# Patient Record
Sex: MALE | Race: WHITE | Employment: OTHER | URBAN - METROPOLITAN AREA
[De-identification: names, ages, dates, MRNs, and addresses within clinical notes are randomized per-mention and may not be internally consistent; named-entity substitution may affect disease eponyms.]

---

## 2017-10-09 ENCOUNTER — APPOINTMENT (OUTPATIENT)
Dept: CT IMAGING | Age: 62
End: 2017-10-09
Attending: EMERGENCY MEDICINE
Payer: COMMERCIAL

## 2017-10-09 ENCOUNTER — HOSPITAL ENCOUNTER (OUTPATIENT)
Age: 62
Setting detail: OBSERVATION
Discharge: HOME | End: 2017-10-10
Attending: STUDENT IN AN ORGANIZED HEALTH CARE EDUCATION/TRAINING PROGRAM | Admitting: FAMILY MEDICINE
Payer: COMMERCIAL

## 2017-10-09 DIAGNOSIS — G45.9 TRANSIENT CEREBRAL ISCHEMIA, UNSPECIFIED TYPE: Primary | ICD-10-CM

## 2017-10-09 DIAGNOSIS — G47.33 OSA (OBSTRUCTIVE SLEEP APNEA): ICD-10-CM

## 2017-10-09 DIAGNOSIS — I16.0 HYPERTENSIVE URGENCY: ICD-10-CM

## 2017-10-09 PROBLEM — R20.0 NUMBNESS ON RIGHT SIDE: Status: ACTIVE | Noted: 2017-10-09

## 2017-10-09 LAB
ALBUMIN SERPL-MCNC: 4 G/DL (ref 3.5–5)
ALBUMIN/GLOB SERPL: 1 {RATIO} (ref 1.1–2.2)
ALP SERPL-CCNC: 83 U/L (ref 45–117)
ALT SERPL-CCNC: 29 U/L (ref 12–78)
ANION GAP SERPL CALC-SCNC: 11 MMOL/L (ref 5–15)
AST SERPL-CCNC: 29 U/L (ref 15–37)
BASOPHILS # BLD: 0 K/UL (ref 0–0.1)
BASOPHILS NFR BLD: 0 % (ref 0–1)
BILIRUB SERPL-MCNC: 0.7 MG/DL (ref 0.2–1)
BUN SERPL-MCNC: 18 MG/DL (ref 6–20)
BUN/CREAT SERPL: 15 (ref 12–20)
CALCIUM SERPL-MCNC: 8.1 MG/DL (ref 8.5–10.1)
CHLORIDE SERPL-SCNC: 104 MMOL/L (ref 97–108)
CK SERPL-CCNC: 143 U/L (ref 39–308)
CO2 SERPL-SCNC: 25 MMOL/L (ref 21–32)
CREAT SERPL-MCNC: 1.21 MG/DL (ref 0.7–1.3)
EOSINOPHIL # BLD: 0.2 K/UL (ref 0–0.4)
EOSINOPHIL NFR BLD: 3 % (ref 0–7)
ERYTHROCYTE [DISTWIDTH] IN BLOOD BY AUTOMATED COUNT: 12.5 % (ref 11.5–14.5)
GLOBULIN SER CALC-MCNC: 3.9 G/DL (ref 2–4)
GLUCOSE SERPL-MCNC: 92 MG/DL (ref 65–100)
HCT VFR BLD AUTO: 42.6 % (ref 36.6–50.3)
HGB BLD-MCNC: 15 G/DL (ref 12.1–17)
INR PPP: 1 (ref 0.9–1.1)
LYMPHOCYTES # BLD: 1.9 K/UL (ref 0.8–3.5)
LYMPHOCYTES NFR BLD: 38 % (ref 12–49)
MCH RBC QN AUTO: 33.4 PG (ref 26–34)
MCHC RBC AUTO-ENTMCNC: 35.2 G/DL (ref 30–36.5)
MCV RBC AUTO: 94.9 FL (ref 80–99)
MONOCYTES # BLD: 0.5 K/UL (ref 0–1)
MONOCYTES NFR BLD: 10 % (ref 5–13)
NEUTS SEG # BLD: 2.5 K/UL (ref 1.8–8)
NEUTS SEG NFR BLD: 49 % (ref 32–75)
PLATELET # BLD AUTO: 189 K/UL (ref 150–400)
POTASSIUM SERPL-SCNC: 3.9 MMOL/L (ref 3.5–5.1)
PROT SERPL-MCNC: 7.9 G/DL (ref 6.4–8.2)
PROTHROMBIN TIME: 10.6 SEC (ref 9–11.1)
RBC # BLD AUTO: 4.49 M/UL (ref 4.1–5.7)
SODIUM SERPL-SCNC: 140 MMOL/L (ref 136–145)
TROPONIN I SERPL-MCNC: <0.04 NG/ML
WBC # BLD AUTO: 5 K/UL (ref 4.1–11.1)

## 2017-10-09 PROCEDURE — 99218 HC RM OBSERVATION: CPT

## 2017-10-09 PROCEDURE — 80053 COMPREHEN METABOLIC PANEL: CPT | Performed by: EMERGENCY MEDICINE

## 2017-10-09 PROCEDURE — 74011250637 HC RX REV CODE- 250/637: Performed by: STUDENT IN AN ORGANIZED HEALTH CARE EDUCATION/TRAINING PROGRAM

## 2017-10-09 PROCEDURE — 82550 ASSAY OF CK (CPK): CPT | Performed by: EMERGENCY MEDICINE

## 2017-10-09 PROCEDURE — 85025 COMPLETE CBC W/AUTO DIFF WBC: CPT | Performed by: EMERGENCY MEDICINE

## 2017-10-09 PROCEDURE — 96375 TX/PRO/DX INJ NEW DRUG ADDON: CPT

## 2017-10-09 PROCEDURE — 84484 ASSAY OF TROPONIN QUANT: CPT | Performed by: EMERGENCY MEDICINE

## 2017-10-09 PROCEDURE — 36415 COLL VENOUS BLD VENIPUNCTURE: CPT | Performed by: EMERGENCY MEDICINE

## 2017-10-09 PROCEDURE — 74011250636 HC RX REV CODE- 250/636: Performed by: FAMILY MEDICINE

## 2017-10-09 PROCEDURE — 99284 EMERGENCY DEPT VISIT MOD MDM: CPT

## 2017-10-09 PROCEDURE — 93005 ELECTROCARDIOGRAM TRACING: CPT

## 2017-10-09 PROCEDURE — 93880 EXTRACRANIAL BILAT STUDY: CPT

## 2017-10-09 PROCEDURE — 85610 PROTHROMBIN TIME: CPT | Performed by: STUDENT IN AN ORGANIZED HEALTH CARE EDUCATION/TRAINING PROGRAM

## 2017-10-09 PROCEDURE — 70450 CT HEAD/BRAIN W/O DYE: CPT

## 2017-10-09 RX ORDER — ATORVASTATIN CALCIUM 40 MG/1
40 TABLET, FILM COATED ORAL DAILY
Status: DISCONTINUED | OUTPATIENT
Start: 2017-10-10 | End: 2017-10-10 | Stop reason: HOSPADM

## 2017-10-09 RX ORDER — METOPROLOL SUCCINATE 100 MG/1
100 TABLET, EXTENDED RELEASE ORAL EVERY EVENING
Status: ON HOLD | COMMUNITY
End: 2017-10-10

## 2017-10-09 RX ORDER — SODIUM CHLORIDE 0.9 % (FLUSH) 0.9 %
5-10 SYRINGE (ML) INJECTION AS NEEDED
Status: DISCONTINUED | OUTPATIENT
Start: 2017-10-09 | End: 2017-10-10 | Stop reason: HOSPADM

## 2017-10-09 RX ORDER — SODIUM CHLORIDE 9 MG/ML
125 INJECTION, SOLUTION INTRAVENOUS CONTINUOUS
Status: DISCONTINUED | OUTPATIENT
Start: 2017-10-09 | End: 2017-10-10

## 2017-10-09 RX ORDER — HYDRALAZINE HYDROCHLORIDE 20 MG/ML
15 INJECTION INTRAMUSCULAR; INTRAVENOUS ONCE
Status: COMPLETED | OUTPATIENT
Start: 2017-10-09 | End: 2017-10-09

## 2017-10-09 RX ORDER — SODIUM CHLORIDE 9 MG/ML
125 INJECTION, SOLUTION INTRAVENOUS CONTINUOUS
Status: DISCONTINUED | OUTPATIENT
Start: 2017-10-10 | End: 2017-10-10 | Stop reason: HOSPADM

## 2017-10-09 RX ORDER — SODIUM CHLORIDE 0.9 % (FLUSH) 0.9 %
5-10 SYRINGE (ML) INJECTION EVERY 8 HOURS
Status: DISCONTINUED | OUTPATIENT
Start: 2017-10-10 | End: 2017-10-10 | Stop reason: HOSPADM

## 2017-10-09 RX ORDER — ASPIRIN 81 MG/1
81 TABLET ORAL DAILY
Status: DISCONTINUED | OUTPATIENT
Start: 2017-10-10 | End: 2017-10-10 | Stop reason: HOSPADM

## 2017-10-09 RX ORDER — ASPIRIN 325 MG
325 TABLET ORAL ONCE
Status: COMPLETED | OUTPATIENT
Start: 2017-10-09 | End: 2017-10-09

## 2017-10-09 RX ORDER — OMEPRAZOLE 20 MG/1
20 CAPSULE, DELAYED RELEASE ORAL DAILY
COMMUNITY

## 2017-10-09 RX ADMIN — HYDRALAZINE HYDROCHLORIDE 15 MG: 20 INJECTION INTRAMUSCULAR; INTRAVENOUS at 21:54

## 2017-10-09 RX ADMIN — ASPIRIN 325 MG: 325 TABLET ORAL at 21:54

## 2017-10-09 NOTE — ED TRIAGE NOTES
Pt states that on Friday- Sunday pt had intermittent tingling in his right hand and arm.  Today at noon he states the tingling to his right arm, right side, right chest and face, and in the last hour in his right leg and foot

## 2017-10-09 NOTE — IP AVS SNAPSHOT
[address] 
[phone] Patient: Merle France MRN: [id number] GMQ:3/48/8542 You are allergic to the following No active allergies Recent Documentation Height Weight BMI Smoking Status 1.803 m 96.3 kg 29.62 kg/m2 Never Smoker Emergency Contacts Name Discharge Info Relation Home Work Mobile [address] [facility] CAREGIVER [3] Spouse [3] [phone] About your hospitalization You were admitted on:  October 9, 2017 You last received care in the:  [facility] 5 OBSERVATION You were discharged on:  October 10, 2017 Unit phone number:  [phone] Why you were hospitalized Your primary diagnosis was:  Numbness On Right Side Your diagnoses also included:  Tia (Transient Ischemic Attack), Hypertensive Urgency Providers Seen During Your Hospitalizations Provider Role Specialty Primary office phone Diana Bridges MD Attending Provider Emergency Medicine [phone] Tenzin Irving MD Attending Provider [facility] [phone] Murtaza Gutierrez MD Attending Provider Internal Medicine [phone] Your Primary Care Physician (PCP) Primary Care Physician Office Phone Office Fax Laura Osman [phone] Follow-up Information Follow up With Details Comments Contact Info Praneeth Canchola MD In 1 week  163 [facility] [address] [place] [address] 
[phone] Rakesh Christensen MD In 4 weeks need cpap [address] [address] 
[phone] Current Discharge Medication List  
  
START taking these medications Dose & Instructions Dispensing Information Comments Morning Noon Evening Bedtime  
 aspirin delayed-release 81 mg tablet Your last dose was:     
   
Your next dose is:    
   
   
 Dose:  81 mg  
 Take 1 Tab by mouth daily for 30 days. Quantity:  30 Tab Refills:  0  
     
   
   
   
  
 lisinopril-hydroCHLOROthiazide 20-25 mg per tablet Commonly known as:  Floyce Plenty Your last dose was: Your next dose is:    
   
   
 Dose:  1 Tab Take 1 Tab by mouth daily for 30 days. Quantity:  30 Tab Refills:  0 CONTINUE these medications which have CHANGED Dose & Instructions Dispensing Information Comments Morning Noon Evening Bedtime  
 metoprolol succinate 50 mg XL tablet Commonly known as:  TOPROL-XL What changed:   
- medication strength 
- how much to take Your last dose was: Your next dose is:    
   
   
 Dose:  50 mg Take 1 Tab by mouth every evening for 30 days. Dose confirmed with Rx Query. Quantity:  30 Tab Refills:  0 CONTINUE these medications which have NOT CHANGED Dose & Instructions Dispensing Information Comments Morning Noon Evening Bedtime PriLOSEC 20 mg capsule Generic drug:  omeprazole Your last dose was: Your next dose is:    
   
   
 Dose:  20 mg Take 20 mg by mouth daily. Refills:  0 Where to Get Your Medications Information on where to get these meds will be given to you by the nurse or doctor. ! Ask your nurse or doctor about these medications  
  aspirin delayed-release 81 mg tablet  
 lisinopril-hydroCHLOROthiazide 20-25 mg per tablet  
 metoprolol succinate 50 mg XL tablet Discharge Instructions Discharge Instructions PATIENT ID: Jevon Hackett MRN: [id number] YOB: 1955 DATE OF ADMISSION: 10/9/2017  8:15 PM   
DATE OF DISCHARGE: 10/10/2017 PRIMARY CARE PROVIDER: Liam Nicole MD  
 
ATTENDING PHYSICIAN: Danette Cox MD 
DISCHARGING PROVIDER: Danette Cox MD   
To contact this individual call [phone] and ask the  to page. If unavailable ask to be transferred the Adult Hospitalist Department. DISCHARGE DIAGNOSES TIA/ Uncontrolled HTN 
 
CONSULTATIONS: IP CONSULT TO NEUROLOGY 
IP CONSULT TO NEUROLOGY 
IP CONSULT TO HOSPITALIST 
 
PROCEDURES/SURGERIES: * No surgery found * PENDING TEST RESULTS:  
At the time of discharge the following test results are still pending: FOLLOW UP APPOINTMENTS:  
Follow-up Information Follow up With Details Comments Contact Info Alex Dale MD In 1 week  163 [facility] [address] [place] [address] 
[phone] Noe Bonilla MD In 4 weeks need cpap 1808 [facility] 101 [facility] [address] 
[phone] ADDITIONAL CARE RECOMMENDATIONS:  
 
DIET: Cardiac Diet ACTIVITY: Activity as tolerated WOUND CARE:  
 
EQUIPMENT needed:  
 
 
  
 SNF/Inpatient Rehab/LTAC Independent/assisted living Hospice Other: CDMP Checked:  
Yes x PROBLEM LIST Updated: 
Yes x Signed:  
Miracle Scott MD 
10/10/2017 
1:57 PM 
 
Discharge Orders None MyChart Announcement  We are excited to announce that we are making your provider's discharge notes available to you in [employer] ( [employer] ). You will see these notes when they are completed and signed by the physician that discharged you from your recent hospital stay. If you have any questions or concerns about any information you see in [employer] ( [employer] ), please call the Health Information Department where you were seen or reach out to your Primary Care Provider for more information about your plan of care. Introducing \[facility] [place] [facility]\"" & HEALTH SERVICES! [facility] introduces [employer] ( [employer] ) patient portal. Now you can access parts of your medical record, email your doctor's office, and request medication refills online. 1. In your internet browser, go to [email or web address] 2. Click on the First Time User? Click Here link in the Sign In box. You will see the New Member Sign Up page. 3. Enter your [employer] ( [employer] ) Access Code exactly as it appears below. You will not need to use this code after youve completed the sign-up process. If you do not sign up before the expiration date, you must request a new code. · [employer] ( [employer] ) Access Code: [email or web address] Expires: 1/8/2018  2:18 PM 
 
4. Enter the last four digits of your Social Security Number (xxxx) and Date of Birth (mm/dd/yyyy) as indicated and click Submit. You will be taken to the next sign-up page. 5. Create a SmartStartt ID. This will be your [employer] ( [employer] ) login ID and cannot be changed, so think of one that is secure and easy to remember. 6. Create a [employer] ( [employer] ) password. You can change your password at any time. 7. Enter your Password Reset Question and Answer. This can be used at a later time if you forget your password. 8. Enter your e-mail address. You will receive e-mail notification when new information is available in [address] 9. Click Sign Up. You can now view and download portions of your medical record. 10. Click the Download Summary menu link to download a portable copy of your medical information. If you have questions, please visit the Frequently Asked Questions section of the MyChart website. Remember, MyChart is NOT to be used for urgent needs. For medical emergencies, dial 911. Now available from your iPhone and Android! General Information Please provide this summary of care documentation to your next provider. Patient Signature:  ____________________________________________________________ Date:  ____________________________________________________________  
  
Althia Kras Provider Signature:  ____________________________________________________________ Date:  ____________________________________________________________

## 2017-10-09 NOTE — IP AVS SNAPSHOT
[address] [address] 
[phone] Patient: Richard Benito MRN: [id number] TTA:4/29/3169 Current Discharge Medication List  
  
START taking these medications Dose & Instructions Dispensing Information Comments Morning Noon Evening Bedtime  
 aspirin delayed-release 81 mg tablet Your last dose was: Your next dose is:    
   
   
 Dose:  81 mg Take 1 Tab by mouth daily for 30 days. Quantity:  30 Tab Refills:  0  
     
   
   
   
  
 lisinopril-hydroCHLOROthiazide 20-25 mg per tablet Commonly known as:  Denisse Jovi Your last dose was: Your next dose is:    
   
   
 Dose:  1 Tab Take 1 Tab by mouth daily for 30 days. Quantity:  30 Tab Refills:  0 CONTINUE these medications which have CHANGED Dose & Instructions Dispensing Information Comments Morning Noon Evening Bedtime  
 metoprolol succinate 50 mg XL tablet Commonly known as:  TOPROL-XL What changed:   
- medication strength 
- how much to take Your last dose was: Your next dose is:    
   
   
 Dose:  50 mg Take 1 Tab by mouth every evening for 30 days. Dose confirmed with Rx Query. Quantity:  30 Tab Refills:  0 CONTINUE these medications which have NOT CHANGED Dose & Instructions Dispensing Information Comments Morning Noon Evening Bedtime PriLOSEC 20 mg capsule Generic drug:  omeprazole Your last dose was: Your next dose is:    
   
   
 Dose:  20 mg Take 20 mg by mouth daily. Refills:  0 Where to Get Your Medications Information on where to get these meds will be given to you by the nurse or doctor. ! Ask your nurse or doctor about these medications  
  aspirin delayed-release 81 mg tablet  
 lisinopril-hydroCHLOROthiazide 20-25 mg per tablet metoprolol succinate 50 mg XL tablet

## 2017-10-10 ENCOUNTER — APPOINTMENT (OUTPATIENT)
Dept: MRI IMAGING | Age: 62
End: 2017-10-10
Attending: FAMILY MEDICINE
Payer: COMMERCIAL

## 2017-10-10 VITALS
WEIGHT: 212.38 LBS | HEIGHT: 71 IN | SYSTOLIC BLOOD PRESSURE: 161 MMHG | RESPIRATION RATE: 16 BRPM | BODY MASS INDEX: 29.73 KG/M2 | HEART RATE: 64 BPM | TEMPERATURE: 98 F | OXYGEN SATURATION: 96 % | DIASTOLIC BLOOD PRESSURE: 87 MMHG

## 2017-10-10 PROBLEM — R20.0 NUMBNESS ON RIGHT SIDE: Status: RESOLVED | Noted: 2017-10-09 | Resolved: 2017-10-10

## 2017-10-10 PROBLEM — I16.0 HYPERTENSIVE URGENCY: Status: RESOLVED | Noted: 2017-10-09 | Resolved: 2017-10-10

## 2017-10-10 PROBLEM — G45.9 TIA (TRANSIENT ISCHEMIC ATTACK): Status: RESOLVED | Noted: 2017-10-09 | Resolved: 2017-10-10

## 2017-10-10 LAB
ANION GAP SERPL CALC-SCNC: 9 MMOL/L (ref 5–15)
ATRIAL RATE: 54 BPM
BUN SERPL-MCNC: 17 MG/DL (ref 6–20)
BUN/CREAT SERPL: 15 (ref 12–20)
CALCIUM SERPL-MCNC: 8.3 MG/DL (ref 8.5–10.1)
CALCULATED P AXIS, ECG09: 51 DEGREES
CALCULATED R AXIS, ECG10: 40 DEGREES
CALCULATED T AXIS, ECG11: 38 DEGREES
CHLORIDE SERPL-SCNC: 105 MMOL/L (ref 97–108)
CHOLEST SERPL-MCNC: 174 MG/DL
CO2 SERPL-SCNC: 24 MMOL/L (ref 21–32)
CREAT SERPL-MCNC: 1.15 MG/DL (ref 0.7–1.3)
DIAGNOSIS, 93000: NORMAL
EST. AVERAGE GLUCOSE BLD GHB EST-MCNC: 105 MG/DL
GLUCOSE SERPL-MCNC: 106 MG/DL (ref 65–100)
HBA1C MFR BLD: 5.3 % (ref 4.2–6.3)
HDLC SERPL-MCNC: 33 MG/DL
HDLC SERPL: 5.3 {RATIO} (ref 0–5)
LDLC SERPL CALC-MCNC: 76.6 MG/DL (ref 0–100)
LIPID PROFILE,FLP: ABNORMAL
P-R INTERVAL, ECG05: 158 MS
POTASSIUM SERPL-SCNC: 3.3 MMOL/L (ref 3.5–5.1)
Q-T INTERVAL, ECG07: 442 MS
QRS DURATION, ECG06: 96 MS
QTC CALCULATION (BEZET), ECG08: 419 MS
SODIUM SERPL-SCNC: 138 MMOL/L (ref 136–145)
TRIGL SERPL-MCNC: 322 MG/DL (ref ?–150)
TSH SERPL DL<=0.05 MIU/L-ACNC: 2.63 UIU/ML (ref 0.36–3.74)
VENTRICULAR RATE, ECG03: 54 BPM
VLDLC SERPL CALC-MCNC: 64.4 MG/DL

## 2017-10-10 PROCEDURE — 74011250637 HC RX REV CODE- 250/637: Performed by: HOSPITALIST

## 2017-10-10 PROCEDURE — 96365 THER/PROPH/DIAG IV INF INIT: CPT

## 2017-10-10 PROCEDURE — 74011250637 HC RX REV CODE- 250/637: Performed by: INTERNAL MEDICINE

## 2017-10-10 PROCEDURE — 80048 BASIC METABOLIC PNL TOTAL CA: CPT | Performed by: FAMILY MEDICINE

## 2017-10-10 PROCEDURE — 74011000258 HC RX REV CODE- 258: Performed by: FAMILY MEDICINE

## 2017-10-10 PROCEDURE — A9576 INJ PROHANCE MULTIPACK: HCPCS | Performed by: FAMILY MEDICINE

## 2017-10-10 PROCEDURE — 99218 HC RM OBSERVATION: CPT

## 2017-10-10 PROCEDURE — 93306 TTE W/DOPPLER COMPLETE: CPT

## 2017-10-10 PROCEDURE — 74011250636 HC RX REV CODE- 250/636: Performed by: FAMILY MEDICINE

## 2017-10-10 PROCEDURE — 74011250637 HC RX REV CODE- 250/637: Performed by: FAMILY MEDICINE

## 2017-10-10 PROCEDURE — 36415 COLL VENOUS BLD VENIPUNCTURE: CPT | Performed by: FAMILY MEDICINE

## 2017-10-10 PROCEDURE — 70553 MRI BRAIN STEM W/O & W/DYE: CPT

## 2017-10-10 PROCEDURE — 84443 ASSAY THYROID STIM HORMONE: CPT | Performed by: FAMILY MEDICINE

## 2017-10-10 PROCEDURE — 96366 THER/PROPH/DIAG IV INF ADDON: CPT

## 2017-10-10 PROCEDURE — 80061 LIPID PANEL: CPT | Performed by: FAMILY MEDICINE

## 2017-10-10 PROCEDURE — 83036 HEMOGLOBIN GLYCOSYLATED A1C: CPT | Performed by: FAMILY MEDICINE

## 2017-10-10 RX ORDER — LISINOPRIL AND HYDROCHLOROTHIAZIDE 20; 25 MG/1; MG/1
1 TABLET ORAL DAILY
Qty: 30 TAB | Refills: 0 | Status: SHIPPED | OUTPATIENT
Start: 2017-10-10 | End: 2017-11-09

## 2017-10-10 RX ORDER — ASPIRIN 81 MG/1
81 TABLET ORAL DAILY
Qty: 30 TAB | Refills: 0 | Status: SHIPPED | OUTPATIENT
Start: 2017-10-10 | End: 2017-11-09

## 2017-10-10 RX ORDER — SODIUM CHLORIDE 0.9 % (FLUSH) 0.9 %
10 SYRINGE (ML) INJECTION
Status: COMPLETED | OUTPATIENT
Start: 2017-10-10 | End: 2017-10-10

## 2017-10-10 RX ORDER — ACETAMINOPHEN 325 MG/1
650 TABLET ORAL
Status: DISCONTINUED | OUTPATIENT
Start: 2017-10-10 | End: 2017-10-10 | Stop reason: HOSPADM

## 2017-10-10 RX ORDER — METOPROLOL SUCCINATE 50 MG/1
50 TABLET, EXTENDED RELEASE ORAL EVERY EVENING
Qty: 30 TAB | Refills: 0 | Status: SHIPPED | OUTPATIENT
Start: 2017-10-10 | End: 2017-11-09

## 2017-10-10 RX ORDER — METOPROLOL SUCCINATE 50 MG/1
100 TABLET, EXTENDED RELEASE ORAL EVERY EVENING
Status: DISCONTINUED | OUTPATIENT
Start: 2017-10-10 | End: 2017-10-10

## 2017-10-10 RX ORDER — HYDRALAZINE HYDROCHLORIDE 25 MG/1
25 TABLET, FILM COATED ORAL
Status: DISCONTINUED | OUTPATIENT
Start: 2017-10-10 | End: 2017-10-10 | Stop reason: HOSPADM

## 2017-10-10 RX ADMIN — GADOTERIDOL 19 ML: 279.3 INJECTION, SOLUTION INTRAVENOUS at 06:51

## 2017-10-10 RX ADMIN — HYDROCHLOROTHIAZIDE: 25 TABLET ORAL at 14:10

## 2017-10-10 RX ADMIN — ACETAMINOPHEN 650 MG: 325 TABLET, FILM COATED ORAL at 04:53

## 2017-10-10 RX ADMIN — CALCIUM GLUCONATE 0.5 G: 94 INJECTION, SOLUTION INTRAVENOUS at 00:40

## 2017-10-10 RX ADMIN — SODIUM CHLORIDE 125 ML/HR: 900 INJECTION, SOLUTION INTRAVENOUS at 00:40

## 2017-10-10 RX ADMIN — ASPIRIN 81 MG: 81 TABLET, COATED ORAL at 14:10

## 2017-10-10 RX ADMIN — Medication 10 ML: at 06:52

## 2017-10-10 NOTE — PROGRESS NOTES
Noted pt placed in observation for TIA rule out. Expected disposition after chart review would be to return home. Please consult CM if additional needs/concerns arise.     ZAID Mathews    Discharge Location  Discharge Placement: Home

## 2017-10-10 NOTE — PROGRESS NOTES
Paged Dr Saint Dimes patient has a headache and would like Tylenol. Dr Saint Dimes called back order for Tylenol 650 mg Q6 PRN for headache was given.

## 2017-10-10 NOTE — H&P
H&P dictated. Job# [id number]. Addendum (A/P):    -  Difficulty walking. Fall precautions. -  Hypocalcemia. Ordered Calcium gluconate 0.5 grams IV x 1 dose now. Repeat calcium and albumin level with BMP in a.m.

## 2017-10-10 NOTE — PROGRESS NOTES
Physical Therapy  10.10.17    Order received, chart reviewed, and screening completed by PT. Patient demonstrated bed mobility, transfers, gait, and balance at independent level without difficulty. Mild numbness/tingling present in R hand but not affecting coordination based on testing. No further therapy indicated at this time and patient is clear and safe for d/c home. Thank you for this consult.      Evelio Ast, PT, DPT

## 2017-10-10 NOTE — CONSULTS
Neuro consult completed. Dictated note to follow. Probable hypertensive emergency/TIA. Continue ASA 81mg daily. LDL is 76.6 would not start a statin. Pt has untreated EULOGIO and needs referral to a sleep physician. Instructed to monitor BP at home and f/u with PCP. F/u in neurology prn.

## 2017-10-10 NOTE — PROGRESS NOTES
Problem: Patient Education: Go to Patient Education Activity  Goal: Patient/Family Education  Outcome: Progressing Towards Goal  Continue TIA education. Educate pt on testing.

## 2017-10-10 NOTE — PROGRESS NOTES
Speech pathology  Consult received for SLP dysphagia screening. Nursing dysphagia screen completed and WNL. Patient is on a regular diet/ thin liquids. No reports of facial droop or changes with speech/language. If formal SLP evaluation is desired, please re-consult with SLP eval and treat order as SLP does not complete \"screenings\" only evaluations or treatments. Thanks.     James Magallon M.S. CCC-SLP

## 2017-10-10 NOTE — PROGRESS NOTES
Went over discharge instructions with pt. No signs of distress. IV discontinued. Pt ambulatory at time of discharge with wife.

## 2017-10-10 NOTE — DISCHARGE SUMMARY
Discharge Summary       PATIENT ID: Merle France  MRN: [id number]   YOB: 1955    DATE OF ADMISSION: 10/9/2017  8:15 PM    DATE OF DISCHARGE: 10/10/17   PRIMARY CARE PROVIDER: Praneeth Canchola MD     ATTENDING PHYSICIAN: Venkata Stout  DISCHARGING PROVIDER: Murtaza Gutierrez MD    To contact this individual call [phone] and ask the  to page. If unavailable ask to be transferred the Adult Hospitalist Department. CONSULTATIONS: IP CONSULT TO NEUROLOGY  IP CONSULT TO NEUROLOGY  IP CONSULT TO HOSPITALIST    PROCEDURES/SURGERIES: * No surgery found *    ADMITTING DIAGNOSES & HOSPITAL COURSE:   1. Acute right-sided numbness. From uncontrolled HTN CT/ MRI negative  2. Transient ischemic ECHO/ Carotid WNL seen by neuro logy - cont asas  3. Hypertensive urgency. Changed Anti HTN meds added new agents  4. Gastroesophageal reflux disease. Controlled on omeprazole. 5. Sinus bradycardia. Reduced BB.        PENDING TEST RESULTS:   At the time of discharge the following test results are still pending:     FOLLOW UP APPOINTMENTS:    Follow-up Information     Follow up With Details Comments Contact Info    Praneeth Canchola MD In 1 week  [address] [place] [address]  [phone]      Rakesh Christensen MD In 4 weeks need cpap [address] [facility]  [phone]             ADDITIONAL CARE RECOMMENDATIONS:     DIET: Cardiac Diet    ACTIVITY: Activity as tolerated    WOUND CARE:     EQUIPMENT needed:       DISCHARGE MEDICATIONS:  Current Discharge Medication List      START taking these medications    Details   aspirin delayed-release 81 mg tablet Take 1 Tab by mouth daily for 30 days. Qty: 30 Tab, Refills: 0      lisinopril-hydroCHLOROthiazide (PRINZIDE, ZESTORETIC) 20-25 mg per tablet Take 1 Tab by mouth daily for 30 days.   Qty: 30 Tab, Refills: 0         CONTINUE these medications which have CHANGED    Details   metoprolol succinate (TOPROL-XL) 50 mg XL tablet Take 1 Tab by mouth every evening for 30 days. Dose confirmed with Rx Query. Qty: 30 Tab, Refills: 0         CONTINUE these medications which have NOT CHANGED    Details   omeprazole (PRILOSEC) 20 mg capsule Take 20 mg by mouth daily. NOTIFY YOUR PHYSICIAN FOR ANY OF THE FOLLOWING:   Fever over 101 degrees for 24 hours. Chest pain, shortness of breath, fever, chills, nausea, vomiting, diarrhea, change in mentation, falling, weakness, bleeding. Severe pain or pain not relieved by medications. Or, any other signs or symptoms that you may have questions about.     DISPOSITION:  x  Home With:   OT  PT  HH  RN       Long term SNF/Inpatient Rehab    Independent/assisted living    Hospice    Other:       PATIENT CONDITION AT DISCHARGE:     Functional status    Poor     Deconditioned    x Independent      Cognition   x  Lucid     Forgetful     Dementia      Catheters/lines (plus indication)    Bojorquez     PICC     PEG    x None      Code status   x  Full code     DNR            CHRONIC MEDICAL DIAGNOSES:  Problem List as of 10/10/2017  Date Reviewed: 10/10/2017          Codes Class Noted - Resolved    RESOLVED: TIA (transient ischemic attack) ICD-10-CM: G45.9  ICD-9-CM: 435.9  10/9/2017 - 10/10/2017        RESOLVED: Hypertensive urgency ICD-10-CM: I16.0  ICD-9-CM: 401.9  10/9/2017 - 10/10/2017        * (Principal)RESOLVED: Numbness on right side ICD-10-CM: R20.0  ICD-9-CM: 782.0  10/9/2017 - 10/10/2017              Greater than 20  minutes were spent with the patient on counseling and coordination of care    Signed:   Sherren Flasher, MD  10/10/2017  2:00 PM

## 2017-10-10 NOTE — H&P
[address] [place] [address], [address] [place] [address]   HISTORY AND PHYSICAL       Name:  Jane Gonzales   MR#:  [id number]   :  1955   Account #:  [de-identified]        Date of Adm:  10/09/2017       CHIEF COMPLAINT: Right-sided numbness. HISTORY OF PRESENT ILLNESS: A 71-year-old white male with past   medical history of hypertension, GERD, vertigo, laryngospasms,   presented to the emergency department from home with chief   complaint of right-sided numbness. The patient complains of   numbness that involves the right side of his face, right upper and lower   extremities beginning approximately 3 days ago, initially intermittent,   without specific exacerbating or alleviating factors. He notes that he   had recurrence of symptoms today, which has lasted and remained   constant, moderate to severe, again without specific exacerbating or   alleviating factors. He does not report taking any new medications. He   does not report any slurring of his speech, facial droop. There are no   reports of dizziness, lightheadedness, syncope, loss of consciousness,   visual disturbance, headache, neck pain, back pain. However, he   complains of having right-sided chest tightness, not described as pain,   rated approximately 5/10, moderate severity, initially constant,   spontaneously resolved without specific exacerbating or alleviating   factors, nonradiating. There were no reports of abdominal pain,   nausea, vomiting, diarrhea, melena, dysuria, hematuria, bowel or   bladder incontinence, calf pain, swelling, edema, fever, chills, rash,   cough, congestion, or other constitutional symptoms than those   mentioned. Reportedly he called his PCP today who recommended he   go to the ED for further evaluation. He reports no prior history of CVA,   TIA or other neurological disorders.  On arrival in the emergency   department, initial recorded vital signs were blood pressure 199/101,   heart rate of 54, respiratory rate of 17, O2 saturation was 96% on room   air. Workup included CT of the head without contrast which revealed   no acute intracranial abnormalities. A 12-lead EKG shows sinus   bradycardia. He notably is taking metoprolol at home. He reports   compliance with taking his medications as directed; however, he   reportedly missed his dose earlier today. Hospitalists are now asked to   admit the patient to the medical service for continued evaluation and   treatment. Per review of chart record, ER MD has already spoken with   neurologist on-call in regards to the patient's findings. PAST MEDICAL HISTORY   1. GERD. 2. Hypertension. 3. Vertigo. 4. Laryngospasms. PAST SURGICAL HISTORY: None. MEDICATIONS   1. Toprol- mg p.o. nightly. 2. Omeprazole 20 mg p.o. daily. ALLERGIES: NO KNOWN DRUG ALLERGIES. SOCIAL HISTORY: He denies smoking, illicit drugs. Positive for social   alcohol drinking. He is , ambulates unassisted. FAMILY HISTORY: Hypertension - maternal uncle. Myocardial   infarction - maternal uncle. Metastatic breast cancer, metastasis to bone   - mother, . Brain aneurysm - father  at age 46. REVIEW OF SYSTEMS: Ten systems reviewed. Pertinent positives   were as HPI, otherwise negative. PHYSICAL EXAMINATION   VITAL SIGNS: Temperature 97.7 degrees Fahrenheit, blood pressure   186/90, heart rate of 56, respiratory rate of 14, O2 saturation is 98% on   room air. Recorded weight 212 pounds (96.3 kg), recorded height of 5   feet 11 inches tall. GENERAL: The patient in no acute respiratory distress. PSYCHIATRIC: The patient is awake, alert, oriented x3. NEUROLOGIC: GCS of 15. Moves extremities x4. Sensation is grossly   intact without slurred speech, facial droop. Cranial nerves 2-12 grossly   intact. No pronator drift. HEENT: Normocephalic, atraumatic. PERRLA, EOMs intact. Sclerae   are anicteric. Conjunctivae are clear. Nares are patent. Oropharynx is   clear. Tongue is midline, not edematous. NECK: Supple, without lymphadenopathy, JVD, carotid bruits,   thyromegaly. LYMPH: Negative for cervical or supraclavicular adenopathy. RESPIRATORY: Lungs are clear to auscultation bilaterally. CARDIOVASCULAR: Heart is bradycardic, regular rhythm. No   murmurs, rubs or gallops. GASTROINTESTINAL: Abdomen is soft, nontender, nondistended,   normoactive bowel sounds. No rebound, guarding or rigidity. No   auscultated abdominal bruits. No palpable abdominal mass. BACK: No CVA tenderness. No step-off deformity. MUSCULOSKELETAL: No acute palpable bony deformity. Negative for   calf tenderness. VASCULAR: Radial 2+ and 1+ dorsalis pedis pulses, without cyanosis,   clubbing, or edema. SKIN: Warm and dry. LABS: I have reviewed as follows: Sodium of 140, potassium 3.9,   chloride 104, CO2 of 25, BUN of 18, creatinine 1.21, glucose 92, anion   gap of 11, calcium is 8.1, GFR greater than 60, total bilirubin 0.7, total   protein 7.9, albumin 4.0, ALT 29, AST 29, alkaline phosphatase 83. CK   total 143, troponin I of less than 0.04. WBC of 5.0, hemoglobin 13.0,   hematocrit 42.6, platelets 554, neutrophils 49%. INR 1.0, PT 10.6. CT   of the head without contrast: Results are reviewed and noted in HPI. A   12-lead EKG: Sinus bradycardia at 54 beats per minute. IMPRESSION AND PLAN   1. Acute right-sided numbness. Place on observation status. Placed on   neurovascular checks, fall precautions. 2. Transient ischemic attack. At this time, consider for the same. Order   MRI of the brain, carotid Doppler, 2D echocardiogram. Aspirin has   been ordered. Check his lipid panel. Consult with neurologist. Again,   place on neurovascular checks. Order dysphagia screening. 3. Hypertensive urgency. Would hold metoprolol as he notably has   been bradycardic. Antihypertensives will need be adjusted. Order   hydralazine tonight 15 mg intravenous x1 dose stat.  Repeat blood   pressures and monitor closely. 4. Gastroesophageal reflux disease. Controlled on omeprazole. 5. Sinus bradycardia. Plan as noted above. Make medications   adjustments, monitor closely prior to discharge. 6. Venous thromboembolism prophylaxis. Sequential compression   devices, bilateral lower extremities. APOLONIA Flores MD MP / EKATERINA   D:  10/09/2017   21:58   T:  10/09/2017   22:34   Job #:  [id number]

## 2017-10-10 NOTE — CONSULTS
[address] [facility] 12 1116 [facility]   [address] [place] [address]       Name:  Taniya Arias   MR#:  [id number]   :  1955   Account #:  [de-identified]    Date of Consultation:  10/10/2017   Date of Adm:  10/09/2017       HISTORY OF PRESENT ILLNESS: This is a 72-year-old right-handed   male who presented to the emergency department yesterday with   complaints of right-sided numbness and tingling. The patient is seen   with his wife at the bedside. The patient reports that 3 days prior to   admission he had tingling in his right hand and arm that lasted for   about 30 seconds. He had a second episode 1 day prior to admission   that was the same, lasting about 30 seconds; and then about 9 hours prior to admission, he had onset of right   hand numbness and tingling that then spread to his right torso, right   face, lips, teeth, and then leg. He decided to drive himself to a local   pharmacy to check his blood pressure where it was 160/95, which was   not terribly concerning to him, he has seen it this high in the past. His   wife picked him up from the pharmacy and took him to an urgent care. They took his blood pressure and instructed him to come to the   emergency department. The patient reports that in the emergency   department his right leg became completely numb impairing his ability to walk, resolving last   Night, but his right hand remained tingling throughout the night,   now resolved. His blood pressure at presentation was 199/101. He denies any   headache at the time of his symptoms, but does have a headache   currently and will have intermittent headaches about once a week. Denies prior TIA or stroke symptoms. Denies neck pain or neck injury. Denies diabetes. Does have hypertension. Has hypercholesterolemia,   but diet-controlled with an LDL of 76.6 today.  Has untreated   obstructive sleep apnea, was on CPAP at 15 but it was turned up to 16   and he was unable to tolerate it at that high pressure. He tried to go   back to see his sleep physician, but they had gone out of business so   he has not been using his CPAP. He denies any speech or swallowing   difficulties, no vision changes. His workup here has been completed. CT of the head is unremarkable. EKG showed sinus bradycardia. MRI   of the brain with and without contrast is reviewed in PACS, no acute   changes. He has minimal chronic ischemic disease and carotid   Dopplers with less than 50% stenosis bilaterally. CBC, CMP, TSH,   troponin I and CK all negative. Hemoglobin A1c is 5.3. He was not on   any antiplatelet agent at home, was supposed to be on metoprolol but   was not necessarily compliant. PAST MEDICAL HISTORY   1. Hypertension. 2. Obstructive sleep apnea, untreated. 3. History of high cholesterol, diet-controlled. 4. History of vestibular neuritis in  with intermittent vertigo since then. REVIEW OF SYSTEMS: As per past medical history or HPI, otherwise   reviewed and negative. MEDICATIONS   1. Prilosec. 2. Toprol- mg a day. 3. Here he has been started on aspirin 81 mg a day, Prinzide and Lipitor. ALLERGIES: NONE. SOCIAL HISTORY: He is , lives in [place] with his wife. 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 No tobacco or drug   use. He drinks alcohol, a couple drinks only on some weekends. FAMILY HISTORY: Dad  at 46 it says with a brain aneurysm,   but the patient goes on to describe a blood clot in his father's leg that   went to his brain, so it sounds more like he had an embolic stroke. Mom with history of breast cancer with bone metastases. He has a   maternal uncle with hypertension and MI. Three sisters with   hypertension. Two children who are healthy. PHYSICAL EXAMINATION   VITAL SIGNS: Blood pressure currently 161/87, pulse 64, respiratory   rate 16, saturating 96% on room air. Temperature is 98.    GENERAL: He is a well-nourished, well-developed, healthy-appearing   gentleman, sitting in bed in no distress. HEART: Has a regular rate and rhythm without murmurs. Carotids 2+,   no bruits. EXTREMITIES: Warm. No edema. He has 2+ radial pulses. NEUROLOGIC: Mental status: He is alert. He is oriented x4. Speech   and language are intact. Attention, memory and fund of knowledge are   appropriate. Cranial nerves: No facial asymmetry. No ptosis. Extraocular eye movements are intact without diplopia or nystagmus. His visual fields are full. Pupils are equally round and reactive. Tongue   is midline. Palate elevated symmetrically. Trapezius and   sternocleidomastoid are 5/5. His motor exam is 5/5 throughout. No   pronator drift. No tremors. Sensory: Intact to light touch and pinprick   throughout. Reflexes are 2+ and symmetric in the upper extremities,   unable to elicit in the lower extremities, possibly in part due to   positioning. His toes are downgoing. His coordination was intact to   finger-to-nose, rapid alternating movements, heel-to-shin. Gait not   assessed at this time. STUDIES AND REPORTS: Reviewed above. ASSESSMENT AND PLAN: This is a 63-year-old right-handed   gentleman presenting with a blood pressure of 199/101 with   intermittent right-sided numbness and tingling involving his face, arm,   torso, and leg at various times the previous 3 days with unremarkable   workup and known stroke risk factors of hypertension and untreated   obstructive sleep apnea. History is consistent with hypertensive   emergency versus transient ischemic attack. Recommend continuing   aspirin 81 mg a day. May discontinue the Lipitor as his cholesterol is at   goal with an LDL of 76. Recommend evaluation in the sleep clinic to   get restarted on his CPAP. The patient is instructed to purchase a   blood pressure cuff to monitor his blood pressure at home and follow   up with his primary care provider.  The patient can follow up   in neurology MD MEGHAN Han / TLM   D:  10/10/2017   14:22   T:  10/10/2017   14:57   Job #:  [id number]

## 2017-10-10 NOTE — PROCEDURES
[facility]  *** FINAL REPORT ***    Name: Héctor Shaver  MRN: [id number]    Outpatient  : 14 Mar 1955  HIS Order #: [id number]  [id number] [facility] Visit #: [id number]  Date: 09 Oct 2017    TYPE OF TEST: Cerebrovascular Duplex    REASON FOR TEST  Transient ischemic attacks    Right Carotid:-             Proximal               Mid                 Distal  cm/s  Systolic  Diastolic  Systolic  Diastolic  Systolic  Diastolic  CCA:    542.6      26.0                           105.0      31.0  Bulb:  ECA:    113.0      24.0  ICA:     66.0      20.0                            73.0      27.0  ICA/CCA:  0.6       0.6    ICA Stenosis:    Right Vertebral:-  Finding: Antegrade  Sys:       46.0  Ammy:       12.0    Right Subclavian:    Left Carotid:-            Proximal                Mid                 Distal  cm/s  Systolic  Diastolic  Systolic  Diastolic  Systolic  Diastolic  CCA:    759.7      27.0                           116.0      28.0  Bulb:  ECA:    121.0      30.0  ICA:     76.0      16.0                            67.0      15.0  ICA/CCA:  0.7       0.6    ICA Stenosis:    Left Vertebral:-  Finding: Antegrade  Sys:       55.0  Ammy:       15.0    Left Subclavian:    INTERPRETATION/FINDINGS  PROCEDURE:  Color duplex ultrasound imaging of extracranial  cerebrovascular arteries. FINDINGS:       Right:  Internal carotid velocity is within normal limits. There   is narrowing of the internal carotid flow channel on color Doppler  imaging and non-calcific plaque on B-mode imaging, consistent with  less than 50 percent stenosis. The common and external carotid  arteries are patent and without evidence of hemodynamically  significant stenosis. Left:  Internal carotid velocity is within normal limits. There  is narrowing of the internal carotid flow channel on color Doppler  imaging and non-calcific plaque on B-mode imaging, consistent with  less than 50 percent stenosis.   The common and external carotid  arteries are patent and without evidence of hemodynamically  significant stenosis. IMPRESSION:  Consistent with less than 50 percent stenosis of the  right internal carotid and less than 50 percent  stenosis of the left  internal carotid. Vertebrals are patent with antegrade flow. ADDITIONAL COMMENTS    I have personally reviewed the data relevant to the interpretation of  this  study.     TECHNOLOGIST: Sukhdev Ventura RVT  Signed: 10/09/2017 10:47 PM    PHYSICIAN: Erum Rivero MD  Signed: 10/11/2017 09:11 AM

## 2017-10-10 NOTE — ED NOTES
TRANSFER - OUT REPORT:    Verbal report given to Alexandra Mcneill RN(name) on David Aaron  being transferred to [facility] (unit) for routine progression of care       Report consisted of patients Situation, Background, Assessment and   Recommendations(SBAR). Information from the following report(s) SBAR, Kardex, ED Summary, Intake/Output, MAR, Recent Results and Cardiac Rhythm SB was reviewed with the receiving nurse. Lines:   Peripheral IV 10/02/17 Right Antecubital (Active)   Site Assessment Clean, dry, & intact 10/9/2017  6:58 PM   Phlebitis Assessment 0 10/9/2017  6:58 PM   Infiltration Assessment 0 10/9/2017  6:58 PM   Dressing Status Clean, dry, & intact; Occlusive 10/9/2017  6:58 PM   Dressing Type 4 X 4;Tape;Transparent 10/9/2017  6:58 PM   Hub Color/Line Status Flushed;Patent 10/9/2017  6:58 PM   Action Taken Blood drawn 10/9/2017  6:58 PM        Opportunity for questions and clarification was provided.

## 2017-10-10 NOTE — PROGRESS NOTES
Admission Medication Reconciliation:    Information obtained from: Patient and Rx Query    Significant PMH/Disease States:   Past Medical History:   Diagnosis Date    Gastrointestinal disorder     GERD    Hypertension     Ill-defined condition     vertigo, laryngospasms       Chief Complaint for this Admission:  No chief complaint on file. Allergies:  Review of patient's allergies indicates no known allergies. Prior to Admission Medications:   Prior to Admission Medications   Prescriptions Last Dose Informant Patient Reported? Taking?   metoprolol succinate (TOPROL-XL) 100 mg tablet 10/8/2017 at Unknown time  Yes Yes   Sig: Take 100 mg by mouth every evening. Dose confirmed with Rx Query. omeprazole (PRILOSEC) 20 mg capsule 10/9/2017 at Unknown time  Yes Yes   Sig: Take 20 mg by mouth daily. Facility-Administered Medications: None         Comments/Recommendations:     Spoke with patient regarding allergies and PTA medications. Supplemented information from interview with information listed in Rx Query. 1) Confirmed NKDA. 2) Updated PTA medication list. Added metoprolol and omeprazole. Regarding metoprolol, patient was unsure of strength. Per Rx Query, medication was last filled for metoprolol succinate 100 mg tablet # 90 on 7/31/17 at [facility]. Last dose information listed in table above.       Qamar Mack, PharmD

## 2017-10-10 NOTE — ED PROVIDER NOTES
HPI Comments: 63 y.o. male with past medical history significant for HTN, GERD, and vertigo who presents from home with chief complaint of R-sided numbness. Pt states for the past three days he has had intermittent R-sided numbness and tingling. Pt states three days ago he had an episode of R-arm tingling and numbness that lasted a couple minutes. Pt notes he had another similar episode yesterday. Pt states this afternoon, about nine hours ago (1200), he had an episode of R-facial, R-chest, R-arm, and R-leg numbness and tingling that lasted about one hour. Pt states he had associated difficulty walking during this episode. Pt states these symptoms have resolved, but he reports currently having some slight R-hand tingling. Pt denies aggravating or relieving factors for his symptoms. Pt denies having R-sided weakness or loss of dexterity. Pt states he called his PCP today who recommended he visit the ED for further evaluation. Pt denies history of CVA. Pt notes he had some dental work done on the R-side of his mouth this week. Pt denies having speech difficulty, vision changes, abdominal pain, neck pain, or HA. There are no other acute medical concerns at this time. Social hx: No tobacco    PCP: Shae Giron MD    Note written by Ellsworth Harada. Pema León, as dictated by Thiago Mitchell MD 8:45 PM    The history is provided by the patient and the spouse. Past Medical History:   Diagnosis Date    Gastrointestinal disorder     GERD    Hypertension     Ill-defined condition     vertigo, laryngospasms       History reviewed. No pertinent surgical history. History reviewed. No pertinent family history. Social History     Social History    Marital status:      Spouse name: N/A    Number of children: N/A    Years of education: N/A     Occupational History    Not on file.      Social History Main Topics    Smoking status: Never Smoker    Smokeless tobacco: Never Used   Viridaina. Alcohol use Yes    Drug use: No    Sexual activity: Not on file     Other Topics Concern    Not on file     Social History Narrative    No narrative on file         ALLERGIES: Review of patient's allergies indicates no known allergies. Review of Systems   Eyes: Negative for visual disturbance. Gastrointestinal: Negative for abdominal pain. Musculoskeletal: Negative for neck pain. Neurological: Positive for numbness (R-side). Negative for speech difficulty, weakness and headaches. Positive for R-sided tingling. All other systems reviewed and are negative. Vitals:    10/09/17 1759   BP: (!) 199/101   Pulse: (!) 54   Resp: 17   Temp: 97.7 °F (36.5 °C)   SpO2: 96%   Weight: 96.3 kg (212 lb 6 oz)   Height: 5' 11\" (1.803 m)            Physical Exam   Constitutional: He is oriented to person, place, and time. He appears well-developed. No distress. HENT:   Head: Normocephalic and atraumatic. Eyes: Conjunctivae and EOM are normal.   Neck: Normal range of motion. Neck supple. Cardiovascular: Normal rate, regular rhythm and normal heart sounds. No murmur heard. Pulmonary/Chest: Effort normal and breath sounds normal. No respiratory distress. Abdominal: Soft. Bowel sounds are normal. He exhibits no distension. There is no tenderness. There is no rebound. Musculoskeletal: Normal range of motion. He exhibits no edema or tenderness. Neurological: He is alert and oriented to person, place, and time. No cranial nerve deficit. He exhibits normal muscle tone. Coordination normal.   Skin: Skin is warm and dry. Nursing note and vitals reviewed. Note written by Sushma Lyons, as dictated by Rafy Clemente MD 8:45 PM       [facility]  ED Course       Procedures    CONSULT NOTE:  8:52 PM Rafy Clemente MD spoke with Dr. Saint Profit, Consult for Hospitalist.  Discussed available diagnostic tests and clinical findings. He is in agreement with care plans as outlined.   He will see and admit.    8:53 PM Kylie Tolentino MD spoke with Dr. Lew Stone, Consult for Neurology. Discussed available diagnostic tests and clinical findings. She is in agreement with care plans as outlined. She will provide consultation.

## 2018-03-13 ENCOUNTER — HOSPITAL ENCOUNTER (OUTPATIENT)
Dept: GENERAL RADIOLOGY | Age: 63
Discharge: HOME | End: 2018-03-13
Payer: COMMERCIAL

## 2018-03-13 DIAGNOSIS — M47.816 SPONDYLOSIS OF LUMBAR SPINE: ICD-10-CM

## 2018-03-13 PROCEDURE — 72110 X-RAY EXAM L-2 SPINE 4/>VWS: CPT

## 2018-05-10 NOTE — DISCHARGE INSTRUCTIONS
Discharge Instructions       PATIENT ID: Cayetano Capone  MRN: [id number]   YOB: 1955    DATE OF ADMISSION: 10/9/2017  8:15 PM    DATE OF DISCHARGE: 10/10/2017    PRIMARY CARE PROVIDER: Ailin Briones MD     ATTENDING PHYSICIAN: Kelly Swann MD  DISCHARGING PROVIDER: Kelly Swann MD    To contact this individual call [phone] and ask the  to page. If unavailable ask to be transferred the Adult Hospitalist Department. DISCHARGE DIAGNOSES TIA/ Uncontrolled HTN    CONSULTATIONS: IP CONSULT TO NEUROLOGY  IP CONSULT TO NEUROLOGY  IP CONSULT TO HOSPITALIST    PROCEDURES/SURGERIES: * No surgery found *    PENDING TEST RESULTS:   At the time of discharge the following test results are still pending:     FOLLOW UP APPOINTMENTS:   Follow-up Information     Follow up With Details Comments Contact Info    Ailin Briones MD In 1 week  [address] [place] [address]  [phone]      Johnny Sutherland MD In 4 weeks need [facility] [address] [facility]  [phone]             ADDITIONAL CARE RECOMMENDATIONS:     DIET: Cardiac Diet      ACTIVITY: Activity as tolerated    WOUND CARE:     EQUIPMENT needed:       DISCHARGE MEDICATIONS:   See Medication Reconciliation Form    · It is important that you take the medication exactly as they are prescribed. · Keep your medication in the bottles provided by the pharmacist and keep a list of the medication names, dosages, and times to be taken in your wallet. · Do not take other medications without consulting your doctor. NOTIFY YOUR PHYSICIAN FOR ANY OF THE FOLLOWING:   Fever over 101 degrees for 24 hours. Chest pain, shortness of breath, fever, chills, nausea, vomiting, diarrhea, change in mentation, falling, weakness, bleeding. Severe pain or pain not relieved by medications. Or, any other signs or symptoms that you may have questions about.       DISPOSITION:  x  Home With:   OT  PT  [place]  RN SNF/Inpatient Rehab/LTAC    Independent/assisted living    Hospice    Other:     CDMP Checked:   Yes x     PROBLEM LIST Updated:  Yes x       Signed:   Murtaza Gutierrez MD  10/10/2017  1:57 PM Detail Level: Detailed

## 2024-07-25 ENCOUNTER — HOSPITAL ENCOUNTER (OUTPATIENT)
Facility: HOSPITAL | Age: 69
Discharge: HOME | End: 2024-07-25
Attending: OTOLARYNGOLOGY
Payer: COMMERCIAL

## 2024-07-25 DIAGNOSIS — J39.2 OROPHARYNGEAL MASS: ICD-10-CM

## 2024-07-25 DIAGNOSIS — Z78.9 NON-SMOKER: ICD-10-CM

## 2024-07-25 DIAGNOSIS — J31.2 CHRONIC SORE THROAT: ICD-10-CM

## 2024-07-25 LAB — CREAT BLD-MCNC: 1.4 MG/DL (ref 0.6–1.3)

## 2024-07-25 PROCEDURE — 82565 ASSAY OF CREATININE: CPT

## 2024-07-25 PROCEDURE — 70491 CT SOFT TISSUE NECK W/DYE: CPT

## 2024-07-25 PROCEDURE — 6360000004 HC RX CONTRAST MEDICATION: Performed by: OTOLARYNGOLOGY

## 2024-07-25 RX ADMIN — IOPAMIDOL 100 ML: 612 INJECTION, SOLUTION INTRAVENOUS at 10:23
